# Patient Record
Sex: MALE
[De-identification: names, ages, dates, MRNs, and addresses within clinical notes are randomized per-mention and may not be internally consistent; named-entity substitution may affect disease eponyms.]

---

## 2017-11-07 ENCOUNTER — NON-APPOINTMENT (OUTPATIENT)
Age: 52
End: 2017-11-07

## 2017-11-07 ENCOUNTER — APPOINTMENT (OUTPATIENT)
Dept: INTERNAL MEDICINE | Facility: CLINIC | Age: 52
End: 2017-11-07
Payer: COMMERCIAL

## 2017-11-07 VITALS
DIASTOLIC BLOOD PRESSURE: 95 MMHG | HEIGHT: 65.75 IN | BODY MASS INDEX: 32.53 KG/M2 | WEIGHT: 200 LBS | SYSTOLIC BLOOD PRESSURE: 147 MMHG | HEART RATE: 75 BPM | TEMPERATURE: 98 F | OXYGEN SATURATION: 99 %

## 2017-11-07 DIAGNOSIS — Z87.19 PERSONAL HISTORY OF OTHER DISEASES OF THE DIGESTIVE SYSTEM: ICD-10-CM

## 2017-11-07 DIAGNOSIS — Z83.3 FAMILY HISTORY OF DIABETES MELLITUS: ICD-10-CM

## 2017-11-07 DIAGNOSIS — Z12.5 ENCOUNTER FOR SCREENING FOR MALIGNANT NEOPLASM OF PROSTATE: ICD-10-CM

## 2017-11-07 DIAGNOSIS — Z23 ENCOUNTER FOR IMMUNIZATION: ICD-10-CM

## 2017-11-07 DIAGNOSIS — Z78.9 OTHER SPECIFIED HEALTH STATUS: ICD-10-CM

## 2017-11-07 PROCEDURE — 90686 IIV4 VACC NO PRSV 0.5 ML IM: CPT

## 2017-11-07 PROCEDURE — 93000 ELECTROCARDIOGRAM COMPLETE: CPT

## 2017-11-07 PROCEDURE — G0008: CPT

## 2017-11-07 PROCEDURE — 99386 PREV VISIT NEW AGE 40-64: CPT | Mod: 25

## 2017-11-07 PROCEDURE — 36415 COLL VENOUS BLD VENIPUNCTURE: CPT

## 2017-11-08 ENCOUNTER — RESULT REVIEW (OUTPATIENT)
Age: 52
End: 2017-11-08

## 2017-11-08 LAB
ALBUMIN SERPL ELPH-MCNC: 4.5 G/DL
ALP BLD-CCNC: 69 U/L
ALT SERPL-CCNC: 19 U/L
ANION GAP SERPL CALC-SCNC: 15 MMOL/L
AST SERPL-CCNC: 35 U/L
BASOPHILS # BLD AUTO: 0.03 K/UL
BASOPHILS NFR BLD AUTO: 0.4 %
BILIRUB SERPL-MCNC: 0.3 MG/DL
BUN SERPL-MCNC: 14 MG/DL
CALCIUM SERPL-MCNC: 9.7 MG/DL
CHLORIDE SERPL-SCNC: 104 MMOL/L
CHOLEST SERPL-MCNC: 152 MG/DL
CHOLEST/HDLC SERPL: 3.5 RATIO
CO2 SERPL-SCNC: 21 MMOL/L
CREAT SERPL-MCNC: 1.18 MG/DL
EOSINOPHIL # BLD AUTO: 0.32 K/UL
EOSINOPHIL NFR BLD AUTO: 4.5 %
GLUCOSE SERPL-MCNC: 80 MG/DL
HBA1C MFR BLD HPLC: 5.9 %
HCT VFR BLD CALC: 43.2 %
HDLC SERPL-MCNC: 43 MG/DL
HGB BLD-MCNC: 14.5 G/DL
HIV1+2 AB SPEC QL IA.RAPID: NONREACTIVE
IMM GRANULOCYTES NFR BLD AUTO: 0.1 %
LDLC SERPL CALC-MCNC: 94 MG/DL
LYMPHOCYTES # BLD AUTO: 2.65 K/UL
LYMPHOCYTES NFR BLD AUTO: 37.1 %
MAN DIFF?: NORMAL
MCHC RBC-ENTMCNC: 32.4 PG
MCHC RBC-ENTMCNC: 33.6 GM/DL
MCV RBC AUTO: 96.6 FL
MONOCYTES # BLD AUTO: 0.57 K/UL
MONOCYTES NFR BLD AUTO: 8 %
NEUTROPHILS # BLD AUTO: 3.56 K/UL
NEUTROPHILS NFR BLD AUTO: 49.9 %
PLATELET # BLD AUTO: 300 K/UL
POTASSIUM SERPL-SCNC: 4.3 MMOL/L
PROT SERPL-MCNC: 8.2 G/DL
PSA SERPL-MCNC: 0.85 NG/ML
RBC # BLD: 4.47 M/UL
RBC # FLD: 13.5 %
SODIUM SERPL-SCNC: 140 MMOL/L
TRIGL SERPL-MCNC: 77 MG/DL
WBC # FLD AUTO: 7.14 K/UL

## 2020-06-19 ENCOUNTER — LABORATORY RESULT (OUTPATIENT)
Age: 55
End: 2020-06-19

## 2020-06-19 ENCOUNTER — APPOINTMENT (OUTPATIENT)
Dept: INTERNAL MEDICINE | Facility: CLINIC | Age: 55
End: 2020-06-19
Payer: COMMERCIAL

## 2020-06-19 ENCOUNTER — NON-APPOINTMENT (OUTPATIENT)
Age: 55
End: 2020-06-19

## 2020-06-19 VITALS
HEART RATE: 76 BPM | BODY MASS INDEX: 32.3 KG/M2 | SYSTOLIC BLOOD PRESSURE: 151 MMHG | TEMPERATURE: 98.2 F | WEIGHT: 201 LBS | OXYGEN SATURATION: 98 % | DIASTOLIC BLOOD PRESSURE: 88 MMHG | HEIGHT: 66 IN

## 2020-06-19 VITALS — DIASTOLIC BLOOD PRESSURE: 98 MMHG | SYSTOLIC BLOOD PRESSURE: 142 MMHG

## 2020-06-19 DIAGNOSIS — Z00.00 ENCOUNTER FOR GENERAL ADULT MEDICAL EXAMINATION W/OUT ABNORMAL FINDINGS: ICD-10-CM

## 2020-06-19 DIAGNOSIS — N50.89 OTHER SPECIFIED DISORDERS OF THE MALE GENITAL ORGANS: ICD-10-CM

## 2020-06-19 PROCEDURE — 99396 PREV VISIT EST AGE 40-64: CPT | Mod: 25

## 2020-06-19 PROCEDURE — 36415 COLL VENOUS BLD VENIPUNCTURE: CPT

## 2020-06-19 PROCEDURE — 99213 OFFICE O/P EST LOW 20 MIN: CPT | Mod: 25

## 2020-06-19 PROCEDURE — 93000 ELECTROCARDIOGRAM COMPLETE: CPT

## 2020-06-22 PROBLEM — N50.89 TESTICULAR SWELLING, LEFT: Status: ACTIVE | Noted: 2020-06-19

## 2020-06-22 NOTE — HISTORY OF PRESENT ILLNESS
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 not sexually active \eddie doesn’t exercise

## 2020-06-22 NOTE — PHYSICAL EXAM
[Epididymis] : the epididymides were normal [Testes Tenderness] : no tenderness of the testes [Testes Mass (___cm)] : there were no testicular masses [No Acute Distress] : no acute distress [Well Nourished] : well nourished [Well Developed] : well developed [Well-Appearing] : well-appearing [Normal Sclera/Conjunctiva] : normal sclera/conjunctiva [PERRL] : pupils equal round and reactive to light [EOMI] : extraocular movements intact [Normal Outer Ear/Nose] : the outer ears and nose were normal in appearance [Normal Oropharynx] : the oropharynx was normal [No JVD] : no jugular venous distention [No Lymphadenopathy] : no lymphadenopathy [Supple] : supple [Thyroid Normal, No Nodules] : the thyroid was normal and there were no nodules present [No Respiratory Distress] : no respiratory distress  [No Accessory Muscle Use] : no accessory muscle use [Clear to Auscultation] : lungs were clear to auscultation bilaterally [Regular Rhythm] : with a regular rhythm [Normal Rate] : normal rate  [Normal S1, S2] : normal S1 and S2 [No Murmur] : no murmur heard [No Abdominal Bruit] : a ~M bruit was not heard ~T in the abdomen [No Carotid Bruits] : no carotid bruits [Pedal Pulses Present] : the pedal pulses are present [No Varicosities] : no varicosities [No Edema] : there was no peripheral edema [No Palpable Aorta] : no palpable aorta [Soft] : abdomen soft [No Extremity Clubbing/Cyanosis] : no extremity clubbing/cyanosis [Non Tender] : non-tender [Non-distended] : non-distended [No Masses] : no abdominal mass palpated [No HSM] : no HSM [Normal Bowel Sounds] : normal bowel sounds [Normal Posterior Cervical Nodes] : no posterior cervical lymphadenopathy [Normal Anterior Cervical Nodes] : no anterior cervical lymphadenopathy [No Spinal Tenderness] : no spinal tenderness [No CVA Tenderness] : no CVA  tenderness [Grossly Normal Strength/Tone] : grossly normal strength/tone [No Joint Swelling] : no joint swelling [No Rash] : no rash [No Focal Deficits] : no focal deficits [Coordination Grossly Intact] : coordination grossly intact [Deep Tendon Reflexes (DTR)] : deep tendon reflexes were 2+ and symmetric [Normal Gait] : normal gait [Normal Affect] : the affect was normal [Normal Insight/Judgement] : insight and judgment were intact [FreeTextEntry1] : left testicular enlargement  [de-identified] : obese

## 2020-06-22 NOTE — HEALTH RISK ASSESSMENT
[0] : 2) Feeling down, depressed, or hopeless: Not at all (0) [[id number]] : 0 [ColonoscopyDate] : 07/2014

## 2020-06-24 ENCOUNTER — OUTPATIENT (OUTPATIENT)
Dept: OUTPATIENT SERVICES | Facility: HOSPITAL | Age: 55
LOS: 1 days | End: 2020-06-24

## 2020-06-24 ENCOUNTER — APPOINTMENT (OUTPATIENT)
Dept: ULTRASOUND IMAGING | Facility: CLINIC | Age: 55
End: 2020-06-24
Payer: COMMERCIAL

## 2020-06-24 ENCOUNTER — RESULT REVIEW (OUTPATIENT)
Age: 55
End: 2020-06-24

## 2020-06-24 PROCEDURE — 93975 VASCULAR STUDY: CPT | Mod: 26

## 2020-06-24 PROCEDURE — 76870 US EXAM SCROTUM: CPT | Mod: 26

## 2020-06-26 LAB
ALBUMIN SERPL ELPH-MCNC: 4.5 G/DL
ALP BLD-CCNC: 74 U/L
ALT SERPL-CCNC: 25 U/L
ANION GAP SERPL CALC-SCNC: 17 MMOL/L
APPEARANCE: CLEAR
AST SERPL-CCNC: 37 U/L
BACTERIA: NEGATIVE
BASOPHILS # BLD AUTO: 0.13 K/UL
BASOPHILS NFR BLD AUTO: 1.8 %
BILIRUB SERPL-MCNC: 0.3 MG/DL
BILIRUBIN URINE: NEGATIVE
BLOOD URINE: NEGATIVE
BUN SERPL-MCNC: 13 MG/DL
CALCIUM SERPL-MCNC: 9.3 MG/DL
CHLORIDE SERPL-SCNC: 100 MMOL/L
CHOLEST SERPL-MCNC: 143 MG/DL
CHOLEST/HDLC SERPL: 4.2 RATIO
CO2 SERPL-SCNC: 20 MMOL/L
COLOR: YELLOW
CREAT SERPL-MCNC: 1.14 MG/DL
EOSINOPHIL # BLD AUTO: 0.64 K/UL
EOSINOPHIL NFR BLD AUTO: 8.9 %
ESTIMATED AVERAGE GLUCOSE: 117 MG/DL
GLUCOSE QUALITATIVE U: NEGATIVE
GLUCOSE SERPL-MCNC: 82 MG/DL
HBA1C MFR BLD HPLC: 5.7 %
HCT VFR BLD CALC: 41.8 %
HDLC SERPL-MCNC: 34 MG/DL
HGB BLD-MCNC: 12.7 G/DL
HYALINE CASTS: 0 /LPF
KETONES URINE: NEGATIVE
LDLC SERPL CALC-MCNC: 90 MG/DL
LEUKOCYTE ESTERASE URINE: NEGATIVE
LYMPHOCYTES # BLD AUTO: 2.78 K/UL
LYMPHOCYTES NFR BLD AUTO: 38.9 %
MAN DIFF?: NORMAL
MCHC RBC-ENTMCNC: 25.2 PG
MCHC RBC-ENTMCNC: 30.4 GM/DL
MCV RBC AUTO: 83.1 FL
MICROSCOPIC-UA: NORMAL
MONOCYTES # BLD AUTO: 0.63 K/UL
MONOCYTES NFR BLD AUTO: 8.8 %
NEUTROPHILS # BLD AUTO: 2.66 K/UL
NEUTROPHILS NFR BLD AUTO: 37.2 %
NITRITE URINE: NEGATIVE
PH URINE: 5.5
PLATELET # BLD AUTO: 336 K/UL
POTASSIUM SERPL-SCNC: 4.4 MMOL/L
PROT SERPL-MCNC: 7.1 G/DL
PROTEIN URINE: NEGATIVE
PSA FREE FLD-MCNC: 55 %
PSA FREE SERPL-MCNC: 0.53 NG/ML
PSA SERPL-MCNC: 0.95 NG/ML
RBC # BLD: 5.03 M/UL
RBC # FLD: 20.4 %
RED BLOOD CELLS URINE: 0 /HPF
SODIUM SERPL-SCNC: 137 MMOL/L
SPECIFIC GRAVITY URINE: 1.01
SQUAMOUS EPITHELIAL CELLS: 0 /HPF
TRIGL SERPL-MCNC: 97 MG/DL
UROBILINOGEN URINE: NORMAL
WBC # FLD AUTO: 7.14 K/UL
WHITE BLOOD CELLS URINE: 1 /HPF

## 2020-07-08 ENCOUNTER — APPOINTMENT (OUTPATIENT)
Dept: HEART AND VASCULAR | Facility: CLINIC | Age: 55
End: 2020-07-08
Payer: COMMERCIAL

## 2020-07-08 VITALS
DIASTOLIC BLOOD PRESSURE: 89 MMHG | BODY MASS INDEX: 32.82 KG/M2 | HEIGHT: 66 IN | SYSTOLIC BLOOD PRESSURE: 130 MMHG | OXYGEN SATURATION: 97 % | TEMPERATURE: 98.4 F | WEIGHT: 204.19 LBS | HEART RATE: 73 BPM

## 2020-07-08 PROCEDURE — 99204 OFFICE O/P NEW MOD 45 MIN: CPT

## 2020-07-08 NOTE — DISCUSSION/SUMMARY
[FreeTextEntry1] : CP/ ekg changes CARLTON and I had a discussion of his symptoms. His risk for CAD falls intro intermediate. We will therefore move forward with a stress echo at this time to evaluate for obstructive CAD. Risks and benefits discussed.\par HTN - CARLTON  and I had an extensive discussion regarding his blood pressure management. Patient will continue taking current medications in addition to maintaining a low Na diet, with periodic b/p checks at home.\par HLD CARLTON and I discussed his lipid panel and individualized target LDL goal. At this point, will do diet and exercise with anticipation of re-evaluating labs in 3-6 months\par

## 2020-07-08 NOTE — PHYSICAL EXAM
[Normal Appearance] : normal appearance [General Appearance - Well Developed] : well developed [Well Groomed] : well groomed [General Appearance - Well Nourished] : well nourished [General Appearance - In No Acute Distress] : no acute distress [No Deformities] : no deformities [Eyelids - No Xanthelasma] : the eyelids demonstrated no xanthelasmas [Normal Conjunctiva] : the conjunctiva exhibited no abnormalities [Normal Oral Mucosa] : normal oral mucosa [No Oral Pallor] : no oral pallor [No Oral Cyanosis] : no oral cyanosis [Normal Jugular Venous A Waves Present] : normal jugular venous A waves present [No Jugular Venous Carrasquillo A Waves] : no jugular venous carrasquillo A waves [Heart Rate And Rhythm] : heart rate and rhythm were normal [Normal Jugular Venous V Waves Present] : normal jugular venous V waves present [Heart Sounds] : normal S1 and S2 [Murmurs] : no murmurs present [Respiration, Rhythm And Depth] : normal respiratory rhythm and effort [Abdomen Soft] : soft [Exaggerated Use Of Accessory Muscles For Inspiration] : no accessory muscle use [Auscultation Breath Sounds / Voice Sounds] : lungs were clear to auscultation bilaterally [Abdomen Mass (___ Cm)] : no abdominal mass palpated [Abdomen Tenderness] : non-tender [Gait - Sufficient For Exercise Testing] : the gait was sufficient for exercise testing [Abnormal Walk] : normal gait [Nail Clubbing] : no clubbing of the fingernails [Petechial Hemorrhages (___cm)] : no petechial hemorrhages [Cyanosis, Localized] : no localized cyanosis [] : no ischemic changes [Skin Color & Pigmentation] : normal skin color and pigmentation [No Skin Ulcers] : no skin ulcer [No Venous Stasis] : no venous stasis [Skin Lesions] : no skin lesions [No Xanthoma] : no  xanthoma was observed [Oriented To Time, Place, And Person] : oriented to person, place, and time [No Anxiety] : not feeling anxious [Mood] : the mood was normal [Affect] : the affect was normal

## 2020-07-08 NOTE — REASON FOR VISIT
[Initial Evaluation] : an initial evaluation of [Chest Pain] : chest pain [FreeTextEntry1] : Mr. GLEASON is 54 year man who presents secondary chest discomfort. His symptoms started about  4 weeks ago. He describes the pain as mid-sternal, discomfort that was pressure like in nature. Symptoms are worse with activity and improved at rest. They are associated with a shortness of breath. No prior work up reported. We are discussing a cardiac work up for the above complains as one has not been done recently. EKG changes noted on a most recent physical. \par \par

## 2020-07-10 ENCOUNTER — APPOINTMENT (OUTPATIENT)
Dept: INTERNAL MEDICINE | Facility: CLINIC | Age: 55
End: 2020-07-10

## 2020-07-17 ENCOUNTER — APPOINTMENT (OUTPATIENT)
Dept: HEART AND VASCULAR | Facility: CLINIC | Age: 55
End: 2020-07-17
Payer: COMMERCIAL

## 2020-07-17 VITALS
WEIGHT: 201.06 LBS | DIASTOLIC BLOOD PRESSURE: 92 MMHG | BODY MASS INDEX: 32.31 KG/M2 | HEART RATE: 100 BPM | SYSTOLIC BLOOD PRESSURE: 128 MMHG | TEMPERATURE: 98.6 F | HEIGHT: 66 IN | OXYGEN SATURATION: 97 %

## 2020-07-17 DIAGNOSIS — I10 ESSENTIAL (PRIMARY) HYPERTENSION: ICD-10-CM

## 2020-07-17 DIAGNOSIS — R07.9 CHEST PAIN, UNSPECIFIED: ICD-10-CM

## 2020-07-17 DIAGNOSIS — E78.6 LIPOPROTEIN DEFICIENCY: ICD-10-CM

## 2020-07-17 DIAGNOSIS — R94.31 ABNORMAL ELECTROCARDIOGRAM [ECG] [EKG]: ICD-10-CM

## 2020-07-17 PROCEDURE — 99214 OFFICE O/P EST MOD 30 MIN: CPT

## 2020-07-17 PROCEDURE — 93351 STRESS TTE COMPLETE: CPT

## 2020-07-17 RX ORDER — LOSARTAN POTASSIUM 50 MG/1
50 TABLET, FILM COATED ORAL DAILY
Qty: 30 | Refills: 3 | Status: ACTIVE | COMMUNITY
Start: 2020-06-19 | End: 1900-01-01

## 2020-07-17 NOTE — PHYSICAL EXAM
[General Appearance - Well Developed] : well developed [General Appearance - Well Nourished] : well nourished [Normal Appearance] : normal appearance [Well Groomed] : well groomed [No Deformities] : no deformities [General Appearance - In No Acute Distress] : no acute distress [Normal Conjunctiva] : the conjunctiva exhibited no abnormalities [Normal Oral Mucosa] : normal oral mucosa [No Oral Pallor] : no oral pallor [Eyelids - No Xanthelasma] : the eyelids demonstrated no xanthelasmas [No Oral Cyanosis] : no oral cyanosis [Normal Jugular Venous A Waves Present] : normal jugular venous A waves present [Normal Jugular Venous V Waves Present] : normal jugular venous V waves present [No Jugular Venous Carrasquillo A Waves] : no jugular venous carrasquillo A waves [Respiration, Rhythm And Depth] : normal respiratory rhythm and effort [Auscultation Breath Sounds / Voice Sounds] : lungs were clear to auscultation bilaterally [Exaggerated Use Of Accessory Muscles For Inspiration] : no accessory muscle use [Heart Rate And Rhythm] : heart rate and rhythm were normal [Abdomen Soft] : soft [Heart Sounds] : normal S1 and S2 [Murmurs] : no murmurs present [Abdomen Mass (___ Cm)] : no abdominal mass palpated [Abdomen Tenderness] : non-tender [Cyanosis, Localized] : no localized cyanosis [Gait - Sufficient For Exercise Testing] : the gait was sufficient for exercise testing [Abnormal Walk] : normal gait [Nail Clubbing] : no clubbing of the fingernails [Skin Color & Pigmentation] : normal skin color and pigmentation [Petechial Hemorrhages (___cm)] : no petechial hemorrhages [] : no rash [Skin Lesions] : no skin lesions [No Venous Stasis] : no venous stasis [Oriented To Time, Place, And Person] : oriented to person, place, and time [No Skin Ulcers] : no skin ulcer [No Xanthoma] : no  xanthoma was observed [Affect] : the affect was normal [Mood] : the mood was normal [No Anxiety] : not feeling anxious

## 2020-07-17 NOTE — REASON FOR VISIT
[Follow-Up - Clinic] : a clinic follow-up of [FreeTextEntry1] : Mr. GLEASON is 54 year man who presents secondary chest discomfort. His symptoms started about  4 weeks ago. He describes the pain as mid-sternal, discomfort that was pressure like in nature. Symptoms are worse with activity and improved at rest. They are associated with a shortness of breath. No prior work up reported. We are discussing a cardiac work up for the above complains as he just completed a stress echo today. \par  [Chest Pain] : chest pain

## 2020-07-17 NOTE — DISCUSSION/SUMMARY
[FreeTextEntry1] : HTN - CARLTON  and I had an extensive discussion regarding his blood pressure management. Patient will continue taking current medications in addition to maintaining a low Na diet, with periodic b/p checks at home.\par HLD CARLTON and I discussed his lipid panel and individualized target LDL goal. At this point, will do diet and exercise with anticipation of re-evaluating labs in 3-6 months\par CP Inclined towards a conservative follow up in this patient. We had a careful discussion regarding diet and exercise. Will be happy to re-evaluate.\par

## 2020-07-22 ENCOUNTER — APPOINTMENT (OUTPATIENT)
Dept: INTERNAL MEDICINE | Facility: CLINIC | Age: 55
End: 2020-07-22
Payer: COMMERCIAL

## 2020-07-22 VITALS
WEIGHT: 199 LBS | HEIGHT: 66 IN | OXYGEN SATURATION: 99 % | TEMPERATURE: 98.3 F | SYSTOLIC BLOOD PRESSURE: 118 MMHG | DIASTOLIC BLOOD PRESSURE: 79 MMHG | BODY MASS INDEX: 31.98 KG/M2 | HEART RATE: 67 BPM

## 2020-07-22 DIAGNOSIS — E66.9 OBESITY, UNSPECIFIED: ICD-10-CM

## 2020-07-22 DIAGNOSIS — D64.9 ANEMIA, UNSPECIFIED: ICD-10-CM

## 2020-07-22 DIAGNOSIS — R73.03 PREDIABETES.: ICD-10-CM

## 2020-07-22 PROCEDURE — 36415 COLL VENOUS BLD VENIPUNCTURE: CPT

## 2020-07-22 PROCEDURE — 99214 OFFICE O/P EST MOD 30 MIN: CPT | Mod: 25

## 2020-07-23 PROBLEM — D64.9 ANEMIA: Status: ACTIVE | Noted: 2020-07-22

## 2020-07-23 PROBLEM — R73.03 PREDIABETES: Status: ACTIVE | Noted: 2020-07-22

## 2020-07-23 PROBLEM — E66.9 OBESITY (BMI 30-39.9): Status: ACTIVE | Noted: 2020-07-23

## 2020-07-23 NOTE — COUNSELING
[Behavioral health counseling provided] : Behavioral health counseling provided [Potential consequences of obesity discussed] : Potential consequences of obesity discussed [Encouraged to maintain food diary] : Encouraged to maintain food diary [Benefits of weight loss discussed] : Benefits of weight loss discussed [Structured Weight Management Program suggested:] : Structured weight management program suggested [Encouraged to use exercise tracking device] : Encouraged to use exercise tracking device [FreeTextEntry1] : weight watchers [Encouraged to increase physical activity] : Encouraged to increase physical activity

## 2020-07-23 NOTE — HISTORY OF PRESENT ILLNESS
[de-identified] : 54 year old male patient came in for result review and address new diagnoses of prediabetes and anemia.

## 2020-07-27 LAB
BASOPHILS # BLD AUTO: 0.05 K/UL
BASOPHILS NFR BLD AUTO: 0.7 %
EOSINOPHIL # BLD AUTO: 0.4 K/UL
EOSINOPHIL NFR BLD AUTO: 5.7 %
FERRITIN SERPL-MCNC: 14 NG/ML
FOLATE SERPL-MCNC: 19.7 NG/ML
HCT VFR BLD CALC: 42.6 %
HGB BLD-MCNC: 13.1 G/DL
IMM GRANULOCYTES NFR BLD AUTO: 0 %
IRON SATN MFR SERPL: 18 %
IRON SERPL-MCNC: 79 UG/DL
LYMPHOCYTES # BLD AUTO: 2.9 K/UL
LYMPHOCYTES NFR BLD AUTO: 41.5 %
MAN DIFF?: NORMAL
MCHC RBC-ENTMCNC: 26.4 PG
MCHC RBC-ENTMCNC: 30.8 GM/DL
MCV RBC AUTO: 85.9 FL
MONOCYTES # BLD AUTO: 0.55 K/UL
MONOCYTES NFR BLD AUTO: 7.9 %
NEUTROPHILS # BLD AUTO: 3.08 K/UL
NEUTROPHILS NFR BLD AUTO: 44.2 %
PLATELET # BLD AUTO: 324 K/UL
RBC # BLD: 4.96 M/UL
RBC # BLD: 4.96 M/UL
RBC # FLD: 19 %
RETICS # AUTO: 1.1 %
RETICS AGGREG/RBC NFR: 53.1 K/UL
TIBC SERPL-MCNC: 428 UG/DL
TRANSFERRIN SERPL-MCNC: 352 MG/DL
UIBC SERPL-MCNC: 349 UG/DL
VIT B12 SERPL-MCNC: 222 PG/ML
WBC # FLD AUTO: 6.98 K/UL

## 2020-07-29 ENCOUNTER — APPOINTMENT (OUTPATIENT)
Dept: INTERNAL MEDICINE | Facility: CLINIC | Age: 55
End: 2020-07-29

## 2020-10-14 ENCOUNTER — APPOINTMENT (OUTPATIENT)
Dept: HEART AND VASCULAR | Facility: CLINIC | Age: 55
End: 2020-10-14